# Patient Record
Sex: MALE | Race: OTHER | URBAN - METROPOLITAN AREA
[De-identification: names, ages, dates, MRNs, and addresses within clinical notes are randomized per-mention and may not be internally consistent; named-entity substitution may affect disease eponyms.]

---

## 2017-08-30 NOTE — PATIENT DISCUSSION
try daily CLs.  Try AV Oasys one day in same justice.  Also order DT1 to try in same justice.  Pt ed typically better comfort and hygiene.

## 2021-07-26 ENCOUNTER — NEW PATIENT COMPREHENSIVE (OUTPATIENT)
Dept: URBAN - METROPOLITAN AREA CLINIC 52 | Facility: CLINIC | Age: 76
End: 2021-07-26

## 2021-07-26 DIAGNOSIS — H43.813: ICD-10-CM

## 2021-07-26 DIAGNOSIS — E11.9: ICD-10-CM

## 2021-07-26 DIAGNOSIS — H25.812: ICD-10-CM

## 2021-07-26 PROCEDURE — 92134 CPTRZ OPH DX IMG PST SGM RTA: CPT

## 2021-07-26 PROCEDURE — 92004 COMPRE OPH EXAM NEW PT 1/>: CPT

## 2021-07-26 ASSESSMENT — TONOMETRY
OS_IOP_MMHG: 16
OD_IOP_MMHG: 16

## 2021-07-26 ASSESSMENT — VISUAL ACUITY
OD_CC: 20/50+2
OD_GLARE: 20/200
OU_CC: J1+
OD_GLARE: 20/400
OS_GLARE: 20/400
OS_GLARE: 20/40
OU_CC: 20/30-1
OS_CC: 20/30-1

## 2022-09-13 NOTE — PATIENT DISCUSSION
Recommended observation.  renew CTL Rx as is.  is using mag glass a few times a day for small details at work ed a small change in the CTL for [facility] will NOT be equal to mag glass so we agree he cont using that prn.